# Patient Record
Sex: MALE | Race: WHITE | NOT HISPANIC OR LATINO | Employment: STUDENT | URBAN - METROPOLITAN AREA
[De-identification: names, ages, dates, MRNs, and addresses within clinical notes are randomized per-mention and may not be internally consistent; named-entity substitution may affect disease eponyms.]

---

## 2019-06-17 ENCOUNTER — OFFICE VISIT (OUTPATIENT)
Dept: FAMILY MEDICINE CLINIC | Facility: CLINIC | Age: 24
End: 2019-06-17
Payer: COMMERCIAL

## 2019-06-17 VITALS
TEMPERATURE: 97.9 F | BODY MASS INDEX: 23.62 KG/M2 | RESPIRATION RATE: 16 BRPM | SYSTOLIC BLOOD PRESSURE: 128 MMHG | DIASTOLIC BLOOD PRESSURE: 70 MMHG | WEIGHT: 165 LBS | HEART RATE: 98 BPM | HEIGHT: 70 IN | OXYGEN SATURATION: 99 %

## 2019-06-17 DIAGNOSIS — F98.8 ATTENTION DEFICIT DISORDER (ADD) IN ADULT: ICD-10-CM

## 2019-06-17 DIAGNOSIS — Z76.89 ESTABLISHING CARE WITH NEW DOCTOR, ENCOUNTER FOR: Primary | ICD-10-CM

## 2019-06-17 PROCEDURE — 99202 OFFICE O/P NEW SF 15 MIN: CPT | Performed by: FAMILY MEDICINE

## 2019-06-17 PROCEDURE — 3008F BODY MASS INDEX DOCD: CPT | Performed by: FAMILY MEDICINE

## 2019-06-17 PROCEDURE — 1036F TOBACCO NON-USER: CPT | Performed by: FAMILY MEDICINE

## 2019-06-17 RX ORDER — LISDEXAMFETAMINE DIMESYLATE 40 MG/1
40 CAPSULE ORAL DAILY
Refills: 0 | COMMUNITY
Start: 2019-05-23 | End: 2019-09-06 | Stop reason: SDUPTHER

## 2019-09-06 DIAGNOSIS — F98.8 ATTENTION DEFICIT DISORDER (ADD) IN ADULT: Primary | ICD-10-CM

## 2019-09-06 RX ORDER — LISDEXAMFETAMINE DIMESYLATE 40 MG/1
40 CAPSULE ORAL DAILY
Qty: 30 CAPSULE | Refills: 0 | Status: SHIPPED | OUTPATIENT
Start: 2019-09-06 | End: 2019-10-16 | Stop reason: SDUPTHER

## 2019-10-16 DIAGNOSIS — F98.8 ATTENTION DEFICIT DISORDER (ADD) IN ADULT: ICD-10-CM

## 2019-10-16 RX ORDER — LISDEXAMFETAMINE DIMESYLATE 40 MG/1
40 CAPSULE ORAL DAILY
Qty: 30 CAPSULE | Refills: 0 | Status: SHIPPED | OUTPATIENT
Start: 2019-10-16 | End: 2019-11-18 | Stop reason: SDUPTHER

## 2019-11-18 DIAGNOSIS — F98.8 ATTENTION DEFICIT DISORDER (ADD) IN ADULT: ICD-10-CM

## 2019-11-18 RX ORDER — LISDEXAMFETAMINE DIMESYLATE 40 MG/1
40 CAPSULE ORAL DAILY
Qty: 30 CAPSULE | Refills: 0 | Status: SHIPPED | OUTPATIENT
Start: 2019-11-18 | End: 2019-12-27 | Stop reason: SDUPTHER

## 2019-12-27 ENCOUNTER — OFFICE VISIT (OUTPATIENT)
Dept: FAMILY MEDICINE CLINIC | Facility: CLINIC | Age: 24
End: 2019-12-27
Payer: COMMERCIAL

## 2019-12-27 VITALS
TEMPERATURE: 97.7 F | OXYGEN SATURATION: 99 % | BODY MASS INDEX: 24.84 KG/M2 | SYSTOLIC BLOOD PRESSURE: 120 MMHG | WEIGHT: 177.4 LBS | DIASTOLIC BLOOD PRESSURE: 88 MMHG | RESPIRATION RATE: 14 BRPM | HEIGHT: 71 IN | HEART RATE: 73 BPM

## 2019-12-27 DIAGNOSIS — F98.8 ATTENTION DEFICIT DISORDER (ADD) IN ADULT: ICD-10-CM

## 2019-12-27 PROBLEM — Z76.89 ESTABLISHING CARE WITH NEW DOCTOR, ENCOUNTER FOR: Status: RESOLVED | Noted: 2019-06-17 | Resolved: 2019-12-27

## 2019-12-27 PROCEDURE — 1036F TOBACCO NON-USER: CPT | Performed by: NURSE PRACTITIONER

## 2019-12-27 PROCEDURE — 99213 OFFICE O/P EST LOW 20 MIN: CPT | Performed by: NURSE PRACTITIONER

## 2019-12-27 PROCEDURE — 3008F BODY MASS INDEX DOCD: CPT | Performed by: NURSE PRACTITIONER

## 2019-12-27 RX ORDER — LISDEXAMFETAMINE DIMESYLATE 40 MG/1
40 CAPSULE ORAL DAILY
Qty: 30 CAPSULE | Refills: 0 | Status: SHIPPED | OUTPATIENT
Start: 2019-12-27 | End: 2020-02-09 | Stop reason: SDUPTHER

## 2019-12-27 NOTE — ASSESSMENT & PLAN NOTE
Tolerating Vyvanse 40mg daily without issues  No changes, doing well  Recheck 4 months      [facility]

## 2019-12-27 NOTE — PROGRESS NOTES
Assessment/Plan:    1  Attention deficit disorder (ADD) in adult  Assessment & Plan: Tolerating Vyvanse 40mg daily without issues  No changes, doing well  Recheck 4 months  [facility] reviewed     Orders:  -     VYVANSE 40 MG capsule; Take 1 capsule (40 mg total) by mouth dailyMax Daily Amount: 40 mg    Return in about 4 months (around 4/27/2020) for Recheck  Subjective:      Patient ID: Abdirashid Marks is a 22 y o  male  Chief Complaint   Patient presents with    Medication Management     refills       Jonatan Lopez is a 22year old male who presents to the office for medication check  He has been taking vyvanse for attention deficit x's 4-5 years  Reports he is doing well  Denies chest pain, shortness of breath, weight loss or difficulty sleeping  No acute complaints today  The following portions of the patient's history were reviewed and updated as appropriate: allergies, current medications, past family history, past medical history, past social history, past surgical history and problem list     Review of Systems   Constitutional: Negative for chills, fatigue and fever  Respiratory: Negative for cough, chest tightness and shortness of breath  Cardiovascular: Negative for chest pain, palpitations and leg swelling  Psychiatric/Behavioral: Negative for decreased concentration and sleep disturbance  Current Outpatient Medications   Medication Sig Dispense Refill    VYVANSE 40 MG capsule Take 1 capsule (40 mg total) by mouth dailyMax Daily Amount: 40 mg 30 capsule 0     No current facility-administered medications for this visit  Objective:    /88   Pulse 73   Temp 97 7 °F (36 5 °C) (Temporal)   Resp 14   Ht 5' 10 5" (1 791 m)   Wt 80 5 kg (177 lb 6 4 oz)   SpO2 99%   BMI 25 09 kg/m²        Physical Exam   Constitutional: He is oriented to person, place, and time  He appears well-developed and well-nourished  No distress  HENT:   Head: Normocephalic and atraumatic     Eyes: Conjunctivae are normal  Right eye exhibits no discharge  Left eye exhibits no discharge  Neck: Normal range of motion  Neck supple  No thyromegaly present  Cardiovascular: Normal rate, regular rhythm and normal heart sounds  Pulmonary/Chest: Effort normal and breath sounds normal  No respiratory distress  He has no decreased breath sounds  He has no wheezes  He has no rhonchi  He has no rales  Lymphadenopathy:     He has no cervical adenopathy  Neurological: He is alert and oriented to person, place, and time  Skin: Skin is warm and dry  No rash noted  He is not diaphoretic  Psychiatric: He has a normal mood and affect   His behavior is normal  Judgment and thought content normal          GERRY Porras

## 2020-02-09 ENCOUNTER — NURSE TRIAGE (OUTPATIENT)
Dept: OTHER | Facility: OTHER | Age: 25
End: 2020-02-09

## 2020-02-09 DIAGNOSIS — F98.8 ATTENTION DEFICIT DISORDER (ADD) IN ADULT: ICD-10-CM

## 2020-02-09 RX ORDER — LISDEXAMFETAMINE DIMESYLATE 40 MG/1
40 CAPSULE ORAL DAILY
Qty: 30 CAPSULE | Refills: 0 | Status: SHIPPED | OUTPATIENT
Start: 2020-02-09 | End: 2020-03-13 | Stop reason: SDUPTHER

## 2020-02-09 NOTE — TELEPHONE ENCOUNTER
Regarding: Vyvanse Rx; told Mom not called in afterhours but wouldn't hear of it  ----- Message from Pablo Fiore sent at 2/9/2020 10:34 AM EST -----  "My son is in "desperate mode" and needs his Vyvnase today; even if it is just one or two pills "

## 2020-02-09 NOTE — TELEPHONE ENCOUNTER
Spoke with mom  Mom calling for son who has exams tomorrow at 0800  Patient "is really very nervous because because he has no medication left " I informed mom that Vyvanse is a controlled substance and mom asked if I could please page the on call doctor  TT Dr Alvin Lawton  Spoke with Dr Alvin Lawton  Since Dr Alvin Lawton is covering for the practice and does not know the patient, unfortunately she cannot send over a prescription  I called the patient and explained to him that he would have to call the office and that I would send over a request for the medication  Patient verbalized understanding

## 2020-03-13 DIAGNOSIS — F98.8 ATTENTION DEFICIT DISORDER (ADD) IN ADULT: ICD-10-CM

## 2020-03-13 RX ORDER — LISDEXAMFETAMINE DIMESYLATE 40 MG/1
40 CAPSULE ORAL DAILY
Qty: 30 CAPSULE | Refills: 0 | Status: SHIPPED | OUTPATIENT
Start: 2020-03-13 | End: 2020-04-15 | Stop reason: DRUGHIGH

## 2020-04-14 DIAGNOSIS — F98.8 ATTENTION DEFICIT DISORDER (ADD) IN ADULT: ICD-10-CM

## 2020-04-15 ENCOUNTER — TELEMEDICINE (OUTPATIENT)
Dept: FAMILY MEDICINE CLINIC | Facility: CLINIC | Age: 25
End: 2020-04-15
Payer: COMMERCIAL

## 2020-04-15 DIAGNOSIS — F98.8 ATTENTION DEFICIT DISORDER (ADD) IN ADULT: Primary | ICD-10-CM

## 2020-04-15 DIAGNOSIS — G47.9 SLEEP DISTURBANCE: ICD-10-CM

## 2020-04-15 PROCEDURE — 99213 OFFICE O/P EST LOW 20 MIN: CPT | Performed by: NURSE PRACTITIONER

## 2020-04-15 RX ORDER — LISDEXAMFETAMINE DIMESYLATE 40 MG/1
40 CAPSULE ORAL DAILY
Qty: 30 CAPSULE | Refills: 0 | OUTPATIENT
Start: 2020-04-15

## 2020-04-22 ENCOUNTER — TELEMEDICINE (OUTPATIENT)
Dept: FAMILY MEDICINE CLINIC | Facility: CLINIC | Age: 25
End: 2020-04-22
Payer: COMMERCIAL

## 2020-04-22 DIAGNOSIS — F98.8 ATTENTION DEFICIT DISORDER (ADD) IN ADULT: Primary | ICD-10-CM

## 2020-04-22 DIAGNOSIS — G47.9 SLEEP DISTURBANCE: ICD-10-CM

## 2020-04-22 PROCEDURE — 99213 OFFICE O/P EST LOW 20 MIN: CPT | Performed by: NURSE PRACTITIONER

## 2020-05-14 DIAGNOSIS — F98.8 ATTENTION DEFICIT DISORDER (ADD) IN ADULT: ICD-10-CM

## 2020-08-19 DIAGNOSIS — F98.8 ATTENTION DEFICIT DISORDER (ADD) IN ADULT: ICD-10-CM

## 2020-09-18 ENCOUNTER — OFFICE VISIT (OUTPATIENT)
Dept: FAMILY MEDICINE CLINIC | Facility: CLINIC | Age: 25
End: 2020-09-18
Payer: COMMERCIAL

## 2020-09-18 VITALS
SYSTOLIC BLOOD PRESSURE: 140 MMHG | WEIGHT: 180 LBS | BODY MASS INDEX: 25.77 KG/M2 | TEMPERATURE: 99.6 F | HEIGHT: 70 IN | DIASTOLIC BLOOD PRESSURE: 80 MMHG

## 2020-09-18 DIAGNOSIS — R29.810 MOUTH DROOP DUE TO FACIAL WEAKNESS: ICD-10-CM

## 2020-09-18 DIAGNOSIS — F98.8 ATTENTION DEFICIT DISORDER (ADD) IN ADULT: ICD-10-CM

## 2020-09-18 DIAGNOSIS — G51.0 BELL'S PALSY: Primary | ICD-10-CM

## 2020-09-18 PROCEDURE — 99213 OFFICE O/P EST LOW 20 MIN: CPT | Performed by: NURSE PRACTITIONER

## 2020-09-18 RX ORDER — PREDNISONE 20 MG/1
TABLET ORAL
Qty: 11 TABLET | Refills: 0 | Status: SHIPPED | OUTPATIENT
Start: 2020-09-18 | End: 2020-09-25 | Stop reason: SDUPTHER

## 2020-09-18 RX ORDER — IBUPROFEN 600 MG/1
600 TABLET ORAL
COMMUNITY
Start: 2020-09-10 | End: 2020-09-18 | Stop reason: ALTCHOICE

## 2020-09-18 RX ORDER — AMOXICILLIN 500 MG/1
500 TABLET, FILM COATED ORAL EVERY 8 HOURS
COMMUNITY
Start: 2020-09-10 | End: 2020-09-18 | Stop reason: ALTCHOICE

## 2020-09-18 RX ORDER — HYDROCODONE BITARTRATE AND ACETAMINOPHEN 5; 325 MG/1; MG/1
1 TABLET ORAL EVERY 6 HOURS PRN
COMMUNITY
Start: 2020-09-10 | End: 2020-09-18 | Stop reason: ALTCHOICE

## 2020-09-18 NOTE — PROGRESS NOTES
Assessment/Plan:    1  Bell's palsy  -     CBC and differential; Future  -     Sedimentation rate, automated; Future  -     Lyme Antibody Profile with reflex to WB; Future  -     C-reactive protein; Future  -     SHARONA Screen w/ Reflex to Titer/Pattern; Future  -     Basic metabolic panel; Future  -     predniSONE 20 mg tablet; Take 2 tablets PO daily x's 3 days, then take 1 tablet daily x's 3 days, then take 1/2 tablet daily x's 3 days    2  Mouth droop due to facial weakness  Assessment & Plan:  Pt denies pain or sensation of weakness  [place] palsy vs trigeminal impairment s/p infraorbital fracture in high school  Recommend prednisone taper and recheck in one week  Patient Instructions   Complete lab evaluation  Recheck in one week      Return in about 1 week (around 9/25/2020) for Recheck  Subjective:      Patient ID: James Lopez is a 26 y o  male  Chief Complaint   Patient presents with   Abbie Saranya is a 26year old male who presents to the office for evaluation of left sided eye and lip droop  States he has noticed his eye has been drooping for about one year now and has been progressively worsening  States the left side of his lip has been drooping for about 6 months  States he has always noticed somewhat of an eye droop after he fractured his left infraorbital bone in highschool after being accidentally hit with a baseball  Pt denies any sensation of facial weakness  Denies fever, chills, cold sores, ear pain, chest pain, shortness of breath or extremity weakness  Reports tick bite a few months ago  Pt started taking vyvanse prior to onset of his symptoms  The following portions of the patient's history were reviewed and updated as appropriate: allergies, current medications, past family history, past medical history, past social history, past surgical history and problem list     Review of Systems   Constitutional: Negative for chills, diaphoresis, fatigue and fever  HENT: Negative for congestion, ear discharge, ear pain, facial swelling, mouth sores, postnasal drip, rhinorrhea, sinus pressure, sinus pain and sore throat  Eyes: Negative for pain and discharge  Respiratory: Negative for cough, chest tightness, shortness of breath and wheezing  Cardiovascular: Negative for chest pain  Gastrointestinal: Negative for diarrhea, nausea and vomiting  Genitourinary: Negative for dysuria  Musculoskeletal: Negative for myalgias  Skin: Negative for rash  Neurological: Positive for facial asymmetry  Negative for dizziness, speech difficulty, weakness, numbness and headaches  Hematological: Negative for adenopathy  Current Outpatient Medications   Medication Sig Dispense Refill    lisdexamfetamine (VYVANSE) 30 MG capsule Take 1 capsule (30 mg total) by mouth every morningMax Daily Amount: 30 mg 30 capsule 0    predniSONE 20 mg tablet Take 2 tablets PO daily x's 3 days, then take 1 tablet daily x's 3 days, then take 1/2 tablet daily x's 3 days 11 tablet 0     No current facility-administered medications for this visit  Objective:    /80   Temp 99 6 °F (37 6 °C) (Temporal)   Ht 5' 10" (1 778 m)   Wt 81 6 kg (180 lb)   BMI 25 83 kg/m²        Physical Exam  Constitutional:       General: He is not in acute distress  Appearance: He is well-developed  He is not diaphoretic  HENT:      Head: Normocephalic and atraumatic  Eyes:      General: Lids are normal          Right eye: No discharge  Left eye: No discharge  Extraocular Movements: Extraocular movements intact  Conjunctiva/sclera:      Left eye: Left conjunctiva is injected  Pupils: Pupils are equal, round, and reactive to light  Pupils are equal      Neck:      Musculoskeletal: Normal range of motion and neck supple  Thyroid: No thyromegaly  Cardiovascular:      Rate and Rhythm: Normal rate and regular rhythm  Heart sounds: Normal heart sounds  Pulmonary:      Effort: Pulmonary effort is normal  No respiratory distress  Breath sounds: Normal breath sounds  No decreased breath sounds, wheezing, rhonchi or rales  Lymphadenopathy:      Cervical: No cervical adenopathy  Skin:     General: Skin is warm and dry  Findings: No rash  Neurological:      Mental Status: He is alert and oriented to person, place, and time  Cranial Nerves: Facial asymmetry present  Motor: Motor function is intact  Coordination: Coordination is intact  Deep Tendon Reflexes: Reflexes are normal and symmetric  Comments: Left sided eye and lip ptosis      Psychiatric:         Behavior: Behavior normal          Thought Content:  Thought content normal          Judgment: Judgment normal            GERRY Lewis

## 2020-09-18 NOTE — ASSESSMENT & PLAN NOTE
Pt denies pain or sensation of weakness  [place] palsy vs trigeminal impairment s/p infraorbital fracture in high school  Recommend prednisone taper and recheck in one week

## 2020-09-24 LAB
ANA SER QL IF: NEGATIVE
B BURGDOR AB SER IA-ACNC: <0.9 INDEX
BASOPHILS # BLD AUTO: 58 CELLS/UL (ref 0–200)
BASOPHILS NFR BLD AUTO: 0.4 %
BUN SERPL-MCNC: 17 MG/DL (ref 7–25)
BUN/CREAT SERPL: NORMAL (CALC) (ref 6–22)
CALCIUM SERPL-MCNC: 9.9 MG/DL (ref 8.6–10.3)
CHLORIDE SERPL-SCNC: 101 MMOL/L (ref 98–110)
CO2 SERPL-SCNC: 29 MMOL/L (ref 20–32)
CREAT SERPL-MCNC: 1.2 MG/DL (ref 0.6–1.35)
CRP SERPL-MCNC: 0.3 MG/L
EOSINOPHIL # BLD AUTO: 58 CELLS/UL (ref 15–500)
EOSINOPHIL NFR BLD AUTO: 0.4 %
ERYTHROCYTE [DISTWIDTH] IN BLOOD BY AUTOMATED COUNT: 12.1 % (ref 11–15)
ERYTHROCYTE [SEDIMENTATION RATE] IN BLOOD BY WESTERGREN METHOD: 2 MM/H
GLUCOSE SERPL-MCNC: 79 MG/DL (ref 65–139)
HCT VFR BLD AUTO: 42.3 % (ref 38.5–50)
HGB BLD-MCNC: 14.7 G/DL (ref 13.2–17.1)
LYMPHOCYTES # BLD AUTO: 5679 CELLS/UL (ref 850–3900)
LYMPHOCYTES NFR BLD AUTO: 38.9 %
MCH RBC QN AUTO: 31.3 PG (ref 27–33)
MCHC RBC AUTO-ENTMCNC: 34.8 G/DL (ref 32–36)
MCV RBC AUTO: 90 FL (ref 80–100)
MONOCYTES # BLD AUTO: 1168 CELLS/UL (ref 200–950)
MONOCYTES NFR BLD AUTO: 8 %
NEUTROPHILS # BLD AUTO: 7636 CELLS/UL (ref 1500–7800)
NEUTROPHILS NFR BLD AUTO: 52.3 %
PLATELET # BLD AUTO: 317 THOUSAND/UL (ref 140–400)
PMV BLD REES-ECKER: 10.4 FL (ref 7.5–12.5)
POTASSIUM SERPL-SCNC: 4.1 MMOL/L (ref 3.5–5.3)
RBC # BLD AUTO: 4.7 MILLION/UL (ref 4.2–5.8)
SL AMB EGFR AFRICAN AMERICAN: 98 ML/MIN/1.73M2
SL AMB EGFR NON AFRICAN AMERICAN: 84 ML/MIN/1.73M2
SODIUM SERPL-SCNC: 139 MMOL/L (ref 135–146)
WBC # BLD AUTO: 14.6 THOUSAND/UL (ref 3.8–10.8)

## 2020-09-25 ENCOUNTER — OFFICE VISIT (OUTPATIENT)
Dept: FAMILY MEDICINE CLINIC | Facility: CLINIC | Age: 25
End: 2020-09-25
Payer: COMMERCIAL

## 2020-09-25 VITALS
RESPIRATION RATE: 16 BRPM | DIASTOLIC BLOOD PRESSURE: 70 MMHG | WEIGHT: 181 LBS | OXYGEN SATURATION: 97 % | TEMPERATURE: 99.2 F | SYSTOLIC BLOOD PRESSURE: 134 MMHG | HEIGHT: 61 IN | HEART RATE: 107 BPM | BODY MASS INDEX: 34.17 KG/M2

## 2020-09-25 DIAGNOSIS — G51.0 BELL'S PALSY: Primary | ICD-10-CM

## 2020-09-25 DIAGNOSIS — Z23 NEED FOR INFLUENZA VACCINATION: ICD-10-CM

## 2020-09-25 PROCEDURE — 99213 OFFICE O/P EST LOW 20 MIN: CPT | Performed by: NURSE PRACTITIONER

## 2020-09-25 PROCEDURE — 90471 IMMUNIZATION ADMIN: CPT

## 2020-09-25 PROCEDURE — 90686 IIV4 VACC NO PRSV 0.5 ML IM: CPT

## 2020-09-25 PROCEDURE — 1036F TOBACCO NON-USER: CPT | Performed by: NURSE PRACTITIONER

## 2020-09-25 RX ORDER — PREDNISONE 20 MG/1
TABLET ORAL
Qty: 11 TABLET | Refills: 0 | Status: SHIPPED | OUTPATIENT
Start: 2020-09-25 | End: 2020-10-27 | Stop reason: ALTCHOICE

## 2020-09-25 NOTE — PATIENT INSTRUCTIONS
Monitor for improvement in symptoms while on prednisone  Consider further follow up if symptoms persist or worsen  May not see complete resolution of facial droop but can expect improvement

## 2020-09-25 NOTE — ASSESSMENT & PLAN NOTE
Elevated white count with normal CRP and SED rate, negative lyme  Recommend recheck of CBC and lyme titers in 2 weeks time  RTO in 4 weeks for recheck

## 2020-10-20 LAB
BASOPHILS # BLD AUTO: 60 CELLS/UL (ref 0–200)
BASOPHILS NFR BLD AUTO: 0.7 %
EOSINOPHIL # BLD AUTO: 60 CELLS/UL (ref 15–500)
EOSINOPHIL NFR BLD AUTO: 0.7 %
ERYTHROCYTE [DISTWIDTH] IN BLOOD BY AUTOMATED COUNT: 12.1 % (ref 11–15)
HCT VFR BLD AUTO: 43 % (ref 38.5–50)
HGB BLD-MCNC: 14.5 G/DL (ref 13.2–17.1)
LYMPHOCYTES # BLD AUTO: 2355 CELLS/UL (ref 850–3900)
LYMPHOCYTES NFR BLD AUTO: 27.7 %
MCH RBC QN AUTO: 30.9 PG (ref 27–33)
MCHC RBC AUTO-ENTMCNC: 33.7 G/DL (ref 32–36)
MCV RBC AUTO: 91.7 FL (ref 80–100)
MONOCYTES # BLD AUTO: 502 CELLS/UL (ref 200–950)
MONOCYTES NFR BLD AUTO: 5.9 %
NEUTROPHILS # BLD AUTO: 5525 CELLS/UL (ref 1500–7800)
NEUTROPHILS NFR BLD AUTO: 65 %
PLATELET # BLD AUTO: 318 THOUSAND/UL (ref 140–400)
PMV BLD REES-ECKER: 10.4 FL (ref 7.5–12.5)
RBC # BLD AUTO: 4.69 MILLION/UL (ref 4.2–5.8)
WBC # BLD AUTO: 8.5 THOUSAND/UL (ref 3.8–10.8)

## 2020-10-26 DIAGNOSIS — F98.8 ATTENTION DEFICIT DISORDER (ADD) IN ADULT: ICD-10-CM

## 2020-10-27 ENCOUNTER — TELEMEDICINE (OUTPATIENT)
Dept: FAMILY MEDICINE CLINIC | Facility: CLINIC | Age: 25
End: 2020-10-27
Payer: COMMERCIAL

## 2020-10-27 DIAGNOSIS — G51.0 BELL'S PALSY: Primary | ICD-10-CM

## 2020-10-27 PROCEDURE — 99213 OFFICE O/P EST LOW 20 MIN: CPT | Performed by: NURSE PRACTITIONER

## 2020-12-28 DIAGNOSIS — F98.8 ATTENTION DEFICIT DISORDER (ADD) IN ADULT: ICD-10-CM

## 2021-02-05 DIAGNOSIS — F98.8 ATTENTION DEFICIT DISORDER (ADD) IN ADULT: ICD-10-CM

## 2021-03-15 DIAGNOSIS — F98.8 ATTENTION DEFICIT DISORDER (ADD) IN ADULT: ICD-10-CM

## 2021-04-15 DIAGNOSIS — F98.8 ATTENTION DEFICIT DISORDER (ADD) IN ADULT: ICD-10-CM

## 2021-05-04 ENCOUNTER — TELEMEDICINE (OUTPATIENT)
Dept: FAMILY MEDICINE CLINIC | Facility: CLINIC | Age: 26
End: 2021-05-04
Payer: COMMERCIAL

## 2021-05-04 VITALS — TEMPERATURE: 97.5 F

## 2021-05-04 DIAGNOSIS — Z13.0 SCREENING FOR DEFICIENCY ANEMIA: ICD-10-CM

## 2021-05-04 DIAGNOSIS — F98.8 ATTENTION DEFICIT DISORDER (ADD) IN ADULT: Primary | ICD-10-CM

## 2021-05-04 DIAGNOSIS — Z11.4 SCREENING FOR HIV (HUMAN IMMUNODEFICIENCY VIRUS): ICD-10-CM

## 2021-05-04 DIAGNOSIS — Z13.1 SCREENING FOR DIABETES MELLITUS: ICD-10-CM

## 2021-05-04 DIAGNOSIS — Z13.220 SCREENING FOR LIPID DISORDERS: ICD-10-CM

## 2021-05-04 DIAGNOSIS — Z13.29 SCREENING FOR THYROID DISORDER: ICD-10-CM

## 2021-05-04 DIAGNOSIS — G51.0 BELL'S PALSY: ICD-10-CM

## 2021-05-04 DIAGNOSIS — Z00.00 PREVENTATIVE HEALTH CARE: ICD-10-CM

## 2021-05-04 PROCEDURE — 3725F SCREEN DEPRESSION PERFORMED: CPT | Performed by: NURSE PRACTITIONER

## 2021-05-04 PROCEDURE — 1036F TOBACCO NON-USER: CPT | Performed by: NURSE PRACTITIONER

## 2021-05-04 PROCEDURE — 99213 OFFICE O/P EST LOW 20 MIN: CPT | Performed by: NURSE PRACTITIONER

## 2021-05-04 NOTE — PROGRESS NOTES
Virtual Regular Visit      Assessment/Plan:    Problem List Items Addressed This Visit        Nervous and Auditory    Bell's palsy     Symptoms improved and almost resolved  Other    Attention deficit disorder (ADD) in adult - Primary     Pt is doing well on vyvanse 30 mg capsule daily  Stable, no changes at this time  Other Visit Diagnoses     Preventative health care        Relevant Orders    CBC and differential    Comprehensive metabolic panel    TSH, 3rd generation    Lipid panel    Human Immunodeficiency Virus 1/2 Antigen / Antibody ( Fourth Generation) with Reflex Testing    Screening for deficiency anemia        Relevant Orders    CBC and differential    Screening for diabetes mellitus        Relevant Orders    Comprehensive metabolic panel    Screening for thyroid disorder        Relevant Orders    TSH, 3rd generation    Screening for lipid disorders        Relevant Orders    Lipid panel    Screening for HIV (human immunodeficiency virus)        Relevant Orders    Human Immunodeficiency Virus 1/2 Antigen / Antibody ( Fourth Generation) with Reflex Testing               Reason for visit is   Chief Complaint   Patient presents with    Medication Management     Depression score = 0     Virtual Regular Visit        Encounter provider Romana Skye, [facility]    Provider located at [address] [place] [address]  [id number]      Recent Visits  No visits were found meeting these conditions  Showing recent visits within past 7 days and meeting all other requirements     Today's Visits  Date Type Provider Dept   05/04/21 Telemedicine Romana Skye, [facility]   Showing today's visits and meeting all other requirements     Future Appointments  No visits were found meeting these conditions     Showing future appointments within next 150 days and meeting all other requirements        The patient was identified by name and date of birth  Liz Hahn was informed that this is a telemedicine visit and that the visit is being conducted through [address] [facility] and patient was informed that this is a secure, HIPAA-compliant platform  He agrees to proceed     My office door was closed  No one else was in the room  He acknowledged consent and understanding of privacy and security of the video platform  The patient has agreed to participate and understands they can discontinue the visit at any time  Patient is aware this is a billable service  Lilian Flanagan is a 23 y o  male who is scheduled for a virtual med check  Doing well overall, no acute complaints  No past medical history on file  No past surgical history on file  Current Outpatient Medications   Medication Sig Dispense Refill    CREATINE PO Take 2 5 g by mouth daily      lisdexamfetamine (VYVANSE) 30 MG capsule Take 1 capsule (30 mg total) by mouth every morningMax Daily Amount: 30 mg 30 capsule 0     No current facility-administered medications for this visit  Allergies   Allergen Reactions    Aleve [Naproxen] Hives       Review of Systems   Respiratory: Negative for shortness of breath  Cardiovascular: Negative for chest pain and palpitations  Psychiatric/Behavioral: Negative for behavioral problems, decreased concentration and sleep disturbance  The patient is not nervous/anxious  Video Exam    Vitals:    05/04/21 0834   Temp: 97 5 °F (36 4 °C)   TempSrc: Oral       Physical Exam  Vitals signs reviewed  Constitutional:       Appearance: Normal appearance  Neurological:      Mental Status: He is alert and oriented to person, place, and time  Psychiatric:         Mood and Affect: Mood normal           I spent 15 minutes directly with the patient during this visit      Geneva Blanchard acknowledges that he has consented to an online visit or consultation   He understands that the online visit is based solely on information provided by him, and that, in the absence of a face-to-face physical evaluation by the physician, the diagnosis he receives is both limited and provisional in terms of accuracy and completeness  This is not intended to replace a full medical face-to-face evaluation by the physician  Marcelino Parson understands and accepts these terms

## 2021-06-01 DIAGNOSIS — F98.8 ATTENTION DEFICIT DISORDER (ADD) IN ADULT: ICD-10-CM

## 2021-07-16 DIAGNOSIS — F98.8 ATTENTION DEFICIT DISORDER (ADD) IN ADULT: ICD-10-CM

## 2021-08-23 DIAGNOSIS — F98.8 ATTENTION DEFICIT DISORDER (ADD) IN ADULT: ICD-10-CM

## 2021-09-22 DIAGNOSIS — F98.8 ATTENTION DEFICIT DISORDER (ADD) IN ADULT: ICD-10-CM

## 2021-10-25 DIAGNOSIS — F98.8 ATTENTION DEFICIT DISORDER (ADD) IN ADULT: ICD-10-CM

## 2021-11-24 DIAGNOSIS — F98.8 ATTENTION DEFICIT DISORDER (ADD) IN ADULT: ICD-10-CM

## 2021-12-23 DIAGNOSIS — F98.8 ATTENTION DEFICIT DISORDER (ADD) IN ADULT: ICD-10-CM

## 2021-12-24 ENCOUNTER — NURSE TRIAGE (OUTPATIENT)
Dept: OTHER | Facility: OTHER | Age: 26
End: 2021-12-24

## 2021-12-27 ENCOUNTER — NURSE TRIAGE (OUTPATIENT)
Dept: OTHER | Facility: OTHER | Age: 26
End: 2021-12-27

## 2021-12-27 DIAGNOSIS — F98.8 ATTENTION DEFICIT DISORDER (ADD) IN ADULT: ICD-10-CM

## 2022-01-26 ENCOUNTER — TELEPHONE (OUTPATIENT)
Dept: FAMILY MEDICINE CLINIC | Facility: CLINIC | Age: 27
End: 2022-01-26

## 2022-01-26 NOTE — TELEPHONE ENCOUNTER
Patients mom Flaco Heads called stating her son lives in [place] and is switching pcp  We received a fax from [facility] requesting patient records  Please remove Chase Mitchell as pcp    Thanks

## 2022-05-06 NOTE — TELEPHONE ENCOUNTER
05/06/22 3:03 PM     Thank you for your request  Your request has been received, reviewed, and the patient chart updated  The PCP has successfully been removed with a patient attribution note  This message will now be completed      Thank you  Heike Cunningham

## 2022-09-09 NOTE — PROGRESS NOTES
Assessment/Plan:    1  Bell's palsy  Assessment & Plan:  Elevated white count with normal CRP and SED rate, negative lyme  Recommend recheck of CBC and lyme titers in 2 weeks time  RTO in 4 weeks for recheck  Orders:  -     predniSONE 20 mg tablet; Take 2 tablets PO daily x's 3 days, then take 1 tablet daily x's 3 days, then take 1/2 tablet daily x's 3 days  -     CBC and differential; Future; Expected date: 10/09/2020  -     Lyme Antibody Profile with reflex to WB; Future; Expected date: 10/09/2020    2  Need for influenza vaccination  -     influenza vaccine, quadrivalent, 0 5 mL, preservative-free, for adult and pediatric patients 6 mos+ (AFLURIA, FLUARIX, FLULAVAL, FLUZONE)    BMI Counseling: Body mass index is 34 14 kg/m²  The BMI is above normal  Nutrition recommendations include encouraging healthy choices of fruits and vegetables  Exercise recommendations include exercising 3-5 times per week  Patient Instructions   Monitor for improvement in symptoms while on prednisone  Consider further follow up if symptoms persist or worsen  May not see complete resolution of facial droop but can expect improvement      Return in about 4 weeks (around 10/23/2020) for Recheck, Annual physical     Subjective:      Patient ID: Aram Kelley is a 26 y o  male  Chief Complaint   Patient presents with   Nahomi Engle is a 26year old male who presents to the office for recheck of left sided facial droop, most noticeable in the eye and lip areas  Pt reports he has seen an improvement in the facial droop since starting the prednisone taper about one week ago  The following portions of the patient's history were reviewed and updated as appropriate: allergies, current medications, past family history, past medical history, past social history, past surgical history and problem list     Review of Systems   Constitutional: Negative for chills and fever     Respiratory: Negative for chest tightness Please call pharmacy.  This Rx was already sent over.  Please ask if there was a problem.   and shortness of breath  Cardiovascular: Negative for chest pain  Neurological: Positive for facial asymmetry (left sided facial droop)  Negative for tremors, speech difficulty and weakness  Current Outpatient Medications   Medication Sig Dispense Refill    lisdexamfetamine (VYVANSE) 30 MG capsule Take 1 capsule (30 mg total) by mouth every morningMax Daily Amount: 30 mg 30 capsule 0    predniSONE 20 mg tablet Take 2 tablets PO daily x's 3 days, then take 1 tablet daily x's 3 days, then take 1/2 tablet daily x's 3 days 11 tablet 0     No current facility-administered medications for this visit  Objective:    /70   Pulse (!) 107   Temp 99 2 °F (37 3 °C) (Temporal)   Resp 16   Ht 5' 1 05" (1 551 m)   Wt 82 1 kg (181 lb)   SpO2 97%   BMI 34 14 kg/m²        Physical Exam  Constitutional:       General: He is not in acute distress  Appearance: He is not ill-appearing  HENT:      Head: Normocephalic and atraumatic  Comments: Left eye and lip ptosis   Neurological:      Mental Status: He is alert and oriented to person, place, and time  Psychiatric:         Mood and Affect: Mood normal          Behavior: Behavior normal          Thought Content:  Thought content normal          Judgment: Judgment normal            Romana Skye, CRNP